# Patient Record
Sex: MALE | Race: WHITE | Employment: OTHER | URBAN - METROPOLITAN AREA
[De-identification: names, ages, dates, MRNs, and addresses within clinical notes are randomized per-mention and may not be internally consistent; named-entity substitution may affect disease eponyms.]

---

## 2019-03-01 ENCOUNTER — INITIAL CONSULT (OUTPATIENT)
Dept: SURGERY | Age: 84
End: 2019-03-01
Payer: MEDICARE

## 2019-03-01 VITALS
OXYGEN SATURATION: 96 % | BODY MASS INDEX: 33.51 KG/M2 | WEIGHT: 261 LBS | HEART RATE: 70 BPM | DIASTOLIC BLOOD PRESSURE: 94 MMHG | SYSTOLIC BLOOD PRESSURE: 168 MMHG

## 2019-03-01 DIAGNOSIS — I87.322 CHRONIC VENOUS HYPERTENSION (IDIOPATHIC) WITH INFLAMMATION OF LEFT LOWER EXTREMITY: ICD-10-CM

## 2019-03-01 DIAGNOSIS — R22.43 LOCALIZED SWELLING OF BOTH LOWER LEGS: Primary | ICD-10-CM

## 2019-03-01 PROCEDURE — G8484 FLU IMMUNIZE NO ADMIN: HCPCS | Performed by: SURGERY

## 2019-03-01 PROCEDURE — 1123F ACP DISCUSS/DSCN MKR DOCD: CPT | Performed by: SURGERY

## 2019-03-01 PROCEDURE — 1036F TOBACCO NON-USER: CPT | Performed by: SURGERY

## 2019-03-01 PROCEDURE — G8419 CALC BMI OUT NRM PARAM NOF/U: HCPCS | Performed by: SURGERY

## 2019-03-01 PROCEDURE — 1101F PT FALLS ASSESS-DOCD LE1/YR: CPT | Performed by: SURGERY

## 2019-03-01 PROCEDURE — 99204 OFFICE O/P NEW MOD 45 MIN: CPT | Performed by: SURGERY

## 2019-03-01 PROCEDURE — G8427 DOCREV CUR MEDS BY ELIG CLIN: HCPCS | Performed by: SURGERY

## 2019-03-01 PROCEDURE — 4040F PNEUMOC VAC/ADMIN/RCVD: CPT | Performed by: SURGERY

## 2019-03-01 RX ORDER — CARVEDILOL 25 MG/1
TABLET ORAL
COMMUNITY
Start: 2018-10-24

## 2019-03-01 RX ORDER — LISINOPRIL 40 MG/1
40 TABLET ORAL
COMMUNITY
Start: 2018-12-11

## 2019-03-01 RX ORDER — APIXABAN 2.5 MG/1
TABLET, FILM COATED ORAL
COMMUNITY
Start: 2019-02-23

## 2019-03-01 ASSESSMENT — ENCOUNTER SYMPTOMS
ALLERGIC/IMMUNOLOGIC NEGATIVE: 1
GASTROINTESTINAL NEGATIVE: 1
RESPIRATORY NEGATIVE: 1
EYES NEGATIVE: 1

## 2019-04-26 ENCOUNTER — OFFICE VISIT (OUTPATIENT)
Dept: SURGERY | Age: 84
End: 2019-04-26
Payer: MEDICARE

## 2019-04-26 VITALS
DIASTOLIC BLOOD PRESSURE: 106 MMHG | HEART RATE: 70 BPM | WEIGHT: 258 LBS | BODY MASS INDEX: 33.13 KG/M2 | SYSTOLIC BLOOD PRESSURE: 193 MMHG

## 2019-04-26 DIAGNOSIS — L85.3 DRY SKIN: ICD-10-CM

## 2019-04-26 DIAGNOSIS — I10 ESSENTIAL HYPERTENSION: ICD-10-CM

## 2019-04-26 DIAGNOSIS — R22.43 LOCALIZED SWELLING OF BOTH LOWER LEGS: Primary | ICD-10-CM

## 2019-04-26 DIAGNOSIS — Z95.0 PACEMAKER: ICD-10-CM

## 2019-04-26 DIAGNOSIS — M79.89 LEG SWELLING: ICD-10-CM

## 2019-04-26 PROCEDURE — G8427 DOCREV CUR MEDS BY ELIG CLIN: HCPCS | Performed by: SURGERY

## 2019-04-26 PROCEDURE — 4040F PNEUMOC VAC/ADMIN/RCVD: CPT | Performed by: SURGERY

## 2019-04-26 PROCEDURE — G8419 CALC BMI OUT NRM PARAM NOF/U: HCPCS | Performed by: SURGERY

## 2019-04-26 PROCEDURE — 1036F TOBACCO NON-USER: CPT | Performed by: SURGERY

## 2019-04-26 PROCEDURE — 1123F ACP DISCUSS/DSCN MKR DOCD: CPT | Performed by: SURGERY

## 2019-04-26 PROCEDURE — 99213 OFFICE O/P EST LOW 20 MIN: CPT | Performed by: SURGERY

## 2019-04-26 ASSESSMENT — ENCOUNTER SYMPTOMS
EYE DISCHARGE: 0
GASTROINTESTINAL NEGATIVE: 1
EYE PAIN: 0
EYE REDNESS: 0
RESPIRATORY NEGATIVE: 1
ALLERGIC/IMMUNOLOGIC NEGATIVE: 1
PHOTOPHOBIA: 0
EYE ITCHING: 0

## 2019-04-26 NOTE — PATIENT INSTRUCTIONS
Mr. Cesar Dailey is ok today, Pain of the bilateral knees. Does present with elevated Blood Pressure today. He should follow up with his primary care physician for this. Continue to apply the Circaid Device to the bilateral legs daily.

## 2019-04-26 NOTE — PROGRESS NOTES
Daily Progress Note   Jil Edward MD      4/26/2019    Chief Complaint   Patient presents with    Leg Swelling     Patient is here to for a 1 month follow up on his bilateral lower extremity swelling. HISTORY OF PRESENT ILLNESS:                The patient is a 80 y.o. male who presents with a 1 month follow up visit since the application of the Circaid devices. He also thinks the left LE appears to have increased swelling from before. He received steroid injections to the left knee. Does have pain to the bilateral knees only. He no longer take Coumadin, now takes 2.5mg Eliquis as of October of 2018. Has been treated by Dr. Jessi Boggs for IV Infusions, for loss of blood with previous hospitalaization      Past Medical History:   Diagnosis Date    Heart disease     High blood pressure        Past Surgical History:   Procedure Laterality Date    CORONARY ANGIOPLASTY WITH STENT PLACEMENT  5/30/2008    PACEMAKER INSERTION  3/19/2008       Social History     Socioeconomic History    Marital status:      Spouse name: Not on file    Number of children: Not on file    Years of education: Not on file    Highest education level: Not on file   Occupational History    Not on file   Social Needs    Financial resource strain: Not on file    Food insecurity:     Worry: Not on file     Inability: Not on file    Transportation needs:     Medical: Not on file     Non-medical: Not on file   Tobacco Use    Smoking status: Never Smoker    Smokeless tobacco: Never Used   Substance and Sexual Activity    Alcohol use:  Yes     Alcohol/week: 0.5 oz     Types: 1 drink(s) per week     Comment: Social    Drug use: No    Sexual activity: Not on file   Lifestyle    Physical activity:     Days per week: Not on file     Minutes per session: Not on file    Stress: Not on file   Relationships    Social connections:     Talks on phone: Not on file     Gets together: Not on file     Attends Yazidi service: Not on file     Active member of club or organization: Not on file     Attends meetings of clubs or organizations: Not on file     Relationship status: Not on file    Intimate partner violence:     Fear of current or ex partner: Not on file     Emotionally abused: Not on file     Physically abused: Not on file     Forced sexual activity: Not on file   Other Topics Concern    Not on file   Social History Narrative    Not on file       No family history on file. Current Outpatient Medications:     ELIQUIS 2.5 MG TABS tablet, , Disp: , Rfl:     carvedilol (COREG) 25 MG tablet, TAKE ONE TABLET BY MOUTH TWICE A DAY WITH MEALS, Disp: , Rfl:     lisinopril (PRINIVIL;ZESTRIL) 40 MG tablet, Take 40 mg by mouth, Disp: , Rfl:     Furosemide (LASIX PO), Take  by mouth.  , Disp: , Rfl:     doxazosin (CARDURA) 8 MG tablet, Take 4 mg by mouth nightly , Disp: , Rfl:     metoprolol (LOPRESSOR) 50 MG tablet, Take 50 mg by mouth 2 times daily. , Disp: , Rfl:     quinapril (ACCUPRIL) 40 MG tablet, Take 40 mg by mouth nightly.  , Disp: , Rfl:     simvastatin (ZOCOR) 20 MG tablet, Take 20 mg by mouth nightly.  , Disp: , Rfl:     amLODIPine (NORVASC) 10 MG tablet, Take 10 mg by mouth daily. , Disp: , Rfl:     Omega-3 Fatty Acids (FISH OIL) 1000 MG CAPS, Take 1,000 mg by mouth 3 times daily. , Disp: , Rfl:     multivitamin-iron-minerals-folic acid (CENTRUM) chewable tablet, Take 1 tablet by mouth daily. , Disp: , Rfl:     Multiple Vitamins-Minerals (PRESERVISION AREDS PO), Take  by mouth.  , Disp: , Rfl:     vitamin D (ERGOCALCIFEROL) 35385 UNITS CAPS capsule, Take 50,000 Units by mouth once a week.  , Disp: , Rfl:     warfarin (COUMADIN) 10 MG tablet, Take 10 mg by mouth daily. , Disp: , Rfl:     aspirin 81 MG tablet, Take 81 mg by mouth daily. , Disp: , Rfl:     Patient has no known allergies.     Vitals:    04/26/19 1334 04/26/19 1338   BP: (!) 183/101 (!) 193/106   Pulse: 70    Weight: 258 lb (117 kg)        Hospital Outpatient Visit on 04/01/2014   Component Date Value Ref Range Status    PSA 04/01/2014 8.40* 0.00 - 4.00 ng/mL Final       Review of Systems   Constitutional: Negative. HENT: Negative. Eyes: Positive for visual disturbance ( wears eye glasses). Negative for photophobia, pain, discharge, redness and itching. Respiratory: Negative. Cardiovascular: Positive for leg swelling ( Lt > Rt). Negative for chest pain and palpitations. Gastrointestinal: Negative. Endocrine: Negative. Genitourinary: Negative. Musculoskeletal: Negative. Skin: Negative. Allergic/Immunologic: Negative. Neurological: Negative. Hematological: Negative. Psychiatric/Behavioral: Negative. All other systems reviewed and are negative. Physical Exam   Constitutional: He is oriented to person, place, and time. Vital signs are normal. He appears well-developed and well-nourished. HENT:   Head: Normocephalic and atraumatic. Right Ear: External ear normal.   Left Ear: External ear normal.   Eyes: Pupils are equal, round, and reactive to light. No scleral icterus. Neck: Normal range of motion. Neck supple. No JVD present. No tracheal deviation present. No thyromegaly present. Cardiovascular: Normal rate and normal heart sounds. Exam reveals no gallop. No murmur heard. Pulses:       Dorsalis pedis pulses are 2+ on the right side, and 1+ on the left side. Posterior tibial pulses are 2+ on the right side, and 2+ on the left side. MEASUREMENTS: 3/1/2019    RIGHT ANKLE: 26.1 cm  RIGHT CALF: 42.5  +2 pitting edema     LEFT ANKLE: 27.6 cm  LEFT CALF: 45 cm  +2 pitting edema        MEASUREMENTS: 3/1/2019    RIGHT ANKLE: 39.3 cm  RIGHT CALF: 43.3  +2 pitting edema     LEFT ANKLE: 30.6 cm  LEFT CALF: 44.6 cm  +2 pitting edema     Pulmonary/Chest: Effort normal and breath sounds normal. He has no wheezes. He has no rales. He exhibits no tenderness. Abdominal: Soft.  Bowel sounds are improved right calf is improved a left calf this 1 cm larger  Does present with elevated Blood Pressure today. He should follow up with his primary care physician for this. Continue to apply the Circaid Device to the bilateral legs daily. Return in about 3 months (around 7/26/2019), or Bilateral leg swelling follow up. I Arianna Huang MA am scribing for and in the presence of Theodora Joel MD on this date of 04/26/19    I Emily Key MD personally performed the services described in this documentation as scribed by the Medical Assistant Arianna Huang  in my presence and it is both accurate and complete.         Electronically signed by Theodora Joel MD on 4/26/2019 at 2:20 PM

## 2019-08-16 ENCOUNTER — OFFICE VISIT (OUTPATIENT)
Dept: SURGERY | Age: 84
End: 2019-08-16
Payer: MEDICARE

## 2019-08-16 VITALS
WEIGHT: 264 LBS | SYSTOLIC BLOOD PRESSURE: 186 MMHG | HEART RATE: 69 BPM | DIASTOLIC BLOOD PRESSURE: 93 MMHG | BODY MASS INDEX: 33.9 KG/M2

## 2019-08-16 DIAGNOSIS — I87.322 CHRONIC VENOUS HYPERTENSION (IDIOPATHIC) WITH INFLAMMATION OF LEFT LOWER EXTREMITY: ICD-10-CM

## 2019-08-16 DIAGNOSIS — R22.43 LOCALIZED SWELLING OF BOTH LOWER LEGS: Primary | ICD-10-CM

## 2019-08-16 PROCEDURE — 99213 OFFICE O/P EST LOW 20 MIN: CPT | Performed by: SURGERY

## 2019-08-16 PROCEDURE — G8427 DOCREV CUR MEDS BY ELIG CLIN: HCPCS | Performed by: SURGERY

## 2019-08-16 PROCEDURE — 1036F TOBACCO NON-USER: CPT | Performed by: SURGERY

## 2019-08-16 PROCEDURE — 4040F PNEUMOC VAC/ADMIN/RCVD: CPT | Performed by: SURGERY

## 2019-08-16 PROCEDURE — G8419 CALC BMI OUT NRM PARAM NOF/U: HCPCS | Performed by: SURGERY

## 2019-08-16 PROCEDURE — 1123F ACP DISCUSS/DSCN MKR DOCD: CPT | Performed by: SURGERY

## 2019-08-16 NOTE — PATIENT INSTRUCTIONS
He has been advised to keep his feet up when not wearing the stockings. He is to come back in 6 months for leg swelling and stocking check.

## 2020-02-18 ENCOUNTER — OFFICE VISIT (OUTPATIENT)
Dept: SURGERY | Age: 85
End: 2020-02-18
Payer: MEDICARE

## 2020-02-18 VITALS
DIASTOLIC BLOOD PRESSURE: 97 MMHG | SYSTOLIC BLOOD PRESSURE: 172 MMHG | BODY MASS INDEX: 34.02 KG/M2 | WEIGHT: 265 LBS | HEART RATE: 70 BPM

## 2020-02-18 PROCEDURE — 4040F PNEUMOC VAC/ADMIN/RCVD: CPT | Performed by: SURGERY

## 2020-02-18 PROCEDURE — G8419 CALC BMI OUT NRM PARAM NOF/U: HCPCS | Performed by: SURGERY

## 2020-02-18 PROCEDURE — 1123F ACP DISCUSS/DSCN MKR DOCD: CPT | Performed by: SURGERY

## 2020-02-18 PROCEDURE — 1036F TOBACCO NON-USER: CPT | Performed by: SURGERY

## 2020-02-18 PROCEDURE — G8427 DOCREV CUR MEDS BY ELIG CLIN: HCPCS | Performed by: SURGERY

## 2020-02-18 PROCEDURE — 99214 OFFICE O/P EST MOD 30 MIN: CPT | Performed by: SURGERY

## 2020-02-18 PROCEDURE — G8484 FLU IMMUNIZE NO ADMIN: HCPCS | Performed by: SURGERY

## 2020-02-18 ASSESSMENT — ENCOUNTER SYMPTOMS
EYE PAIN: 0
PHOTOPHOBIA: 0
GASTROINTESTINAL NEGATIVE: 1
EYE ITCHING: 0
RESPIRATORY NEGATIVE: 1
EYE REDNESS: 0
ALLERGIC/IMMUNOLOGIC NEGATIVE: 1
EYE DISCHARGE: 0

## 2020-02-18 NOTE — PROGRESS NOTES
Daily Progress Note   Deena Triana MD      2/18/2020    Chief Complaint   Patient presents with    Leg Swelling     6 month f/u for bilateral leg swelling. Patient wears compression stockings daily. HISTORY OF PRESENT ILLNESS:                The patient is a 80 y.o. male who presents with a 6 leg and stocking check. He still walks and climbs stairs, but his knees keep him from doing as much as they are very painful. Otherwise, Mr. Loretta Simpson has few complaints. Past Medical History:   Diagnosis Date    Heart disease     High blood pressure        Past Surgical History:   Procedure Laterality Date    CORONARY ANGIOPLASTY WITH STENT PLACEMENT  5/30/2008    PACEMAKER INSERTION  3/19/2008       Social History     Socioeconomic History    Marital status:      Spouse name: Not on file    Number of children: Not on file    Years of education: Not on file    Highest education level: Not on file   Occupational History    Not on file   Social Needs    Financial resource strain: Not on file    Food insecurity:     Worry: Not on file     Inability: Not on file    Transportation needs:     Medical: Not on file     Non-medical: Not on file   Tobacco Use    Smoking status: Never Smoker    Smokeless tobacco: Never Used   Substance and Sexual Activity    Alcohol use:  Yes     Alcohol/week: 0.8 standard drinks     Types: 1 drink(s) per week     Comment: Social    Drug use: No    Sexual activity: Not on file   Lifestyle    Physical activity:     Days per week: Not on file     Minutes per session: Not on file    Stress: Not on file   Relationships    Social connections:     Talks on phone: Not on file     Gets together: Not on file     Attends Roman Catholic service: Not on file     Active member of club or organization: Not on file     Attends meetings of clubs or organizations: Not on file     Relationship status: Not on file    Intimate partner violence:     Fear of current or ex partner: Not on file other systems reviewed and are negative. Physical Exam  Vitals signs reviewed. Constitutional:       Appearance: He is well-developed. HENT:      Head: Normocephalic and atraumatic. Right Ear: External ear normal.      Left Ear: External ear normal.   Eyes:      General: No scleral icterus. Pupils: Pupils are equal, round, and reactive to light. Neck:      Musculoskeletal: Normal range of motion and neck supple. Thyroid: No thyromegaly. Vascular: No JVD. Trachea: No tracheal deviation. Cardiovascular:      Rate and Rhythm: Normal rate. Pulses:           Carotid pulses are 2+ on the right side. Dorsalis pedis pulses are 2+ on the right side and 2+ on the left side. Posterior tibial pulses are 2+ on the right side and 2+ on the left side. Heart sounds: Normal heart sounds. No murmur. No gallop. Comments: MEASUREMENTS 2020:    RIGHT ANKLE: 36.0 cm   RIGHT CALF: 43.0 cm     LEFT ANKLE: 31.4 cm   LEFT CALF:  46.1 cm     Pulmonary:      Effort: Pulmonary effort is normal.      Breath sounds: Normal breath sounds. No wheezing or rales. Chest:      Chest wall: No tenderness. Abdominal:      General: Bowel sounds are normal. There is no distension or abdominal bruit. Palpations: Abdomen is soft. There is no mass. Tenderness: There is no abdominal tenderness. There is no guarding or rebound. Hernia: No hernia is present. There is no hernia in the ventral area, right inguinal area or left inguinal area. Genitourinary:     Comments: RECTAL EXAM  &  Guaiac NOT INDICATED  Musculoskeletal: Normal range of motion. General: No tenderness. Right lower le+ Pitting Edema present. Left lower le+ Pitting Edema present. Lymphadenopathy:      Head:      Right side of head: No submental, submandibular, preauricular or occipital adenopathy.       Left side of head: No submental, submandibular, preauricular or occipital adenopathy. Cervical: No cervical adenopathy. Right cervical: No superficial, deep or posterior cervical adenopathy. Left cervical: No superficial, deep or posterior cervical adenopathy. Upper Body:      Right upper body: No supraclavicular or pectoral adenopathy. Left upper body: No supraclavicular or pectoral adenopathy. Skin:     General: Skin is warm and dry. Neurological:      Mental Status: He is alert and oriented to person, place, and time. Cranial Nerves: No cranial nerve deficit. Sensory: No sensory deficit. Motor: No tremor, atrophy or abnormal muscle tone. Coordination: Coordination normal.      Gait: Gait normal.   Psychiatric:         Behavior: Behavior normal.         Thought Content: Thought content normal.         Judgment: Judgment normal.           ASSESSMENT:    Problem List Items Addressed This Visit     Chronic venous hypertension (idiopathic) with inflammation of left lower extremity      Other Visit Diagnoses     Leg swelling    -  Primary        Patient is satisfied wearing a black stocking on his foot and ankle and the Velcro wraps on his calves his gym shoes tied snugly work to keep the swelling out of his feet  PLAN:    Return in six months for a leg and stocking check. Jeremie Coy MA am scribing for and in the presence of Teresa Gonzalez MD on this date of 02/18/20    I Gladys Bonner MD personally performed the services described in this documentation as scribed by the Medical Assistant Lena Dodd in my presence and it is both accurate and complete.         Electronically signed by Teresa Gonzalez MD on 2/18/2020 at 4:23 PM